# Patient Record
Sex: FEMALE | Race: WHITE | NOT HISPANIC OR LATINO | URBAN - METROPOLITAN AREA
[De-identification: names, ages, dates, MRNs, and addresses within clinical notes are randomized per-mention and may not be internally consistent; named-entity substitution may affect disease eponyms.]

---

## 2020-11-16 ENCOUNTER — TELEPHONE ENCOUNTER (OUTPATIENT)
Dept: URBAN - METROPOLITAN AREA CLINIC 113 | Facility: CLINIC | Age: 70
End: 2020-11-16

## 2020-12-09 ENCOUNTER — OFFICE VISIT (OUTPATIENT)
Dept: URBAN - METROPOLITAN AREA CLINIC 72 | Facility: CLINIC | Age: 70
End: 2020-12-09

## 2020-12-16 ENCOUNTER — WEB ENCOUNTER (OUTPATIENT)
Dept: URBAN - METROPOLITAN AREA CLINIC 72 | Facility: CLINIC | Age: 70
End: 2020-12-16

## 2020-12-16 ENCOUNTER — OFFICE VISIT (OUTPATIENT)
Dept: URBAN - METROPOLITAN AREA CLINIC 72 | Facility: CLINIC | Age: 70
End: 2020-12-16
Payer: MEDICARE

## 2020-12-16 ENCOUNTER — TELEPHONE ENCOUNTER (OUTPATIENT)
Dept: URBAN - METROPOLITAN AREA CLINIC 113 | Facility: CLINIC | Age: 70
End: 2020-12-16

## 2020-12-16 VITALS
TEMPERATURE: 98.6 F | HEART RATE: 90 BPM | WEIGHT: 185 LBS | RESPIRATION RATE: 20 BRPM | BODY MASS INDEX: 31.58 KG/M2 | HEIGHT: 64 IN | DIASTOLIC BLOOD PRESSURE: 79 MMHG | SYSTOLIC BLOOD PRESSURE: 151 MMHG

## 2020-12-16 DIAGNOSIS — Z12.11 COLON CANCER SCREENING: ICD-10-CM

## 2020-12-16 DIAGNOSIS — R10.84 GENERALIZED ABDOMINAL PAIN: ICD-10-CM

## 2020-12-16 PROCEDURE — G8483 FLU IMM NO ADMIN DOC REA: HCPCS | Performed by: INTERNAL MEDICINE

## 2020-12-16 PROCEDURE — 3017F COLORECTAL CA SCREEN DOC REV: CPT | Performed by: INTERNAL MEDICINE

## 2020-12-16 PROCEDURE — 1036F TOBACCO NON-USER: CPT | Performed by: INTERNAL MEDICINE

## 2020-12-16 PROCEDURE — 99203 OFFICE O/P NEW LOW 30 MIN: CPT | Performed by: INTERNAL MEDICINE

## 2020-12-16 PROCEDURE — G8427 DOCREV CUR MEDS BY ELIG CLIN: HCPCS | Performed by: INTERNAL MEDICINE

## 2020-12-16 PROCEDURE — G8419 CALC BMI OUT NRM PARAM NOF/U: HCPCS | Performed by: INTERNAL MEDICINE

## 2020-12-16 RX ORDER — ATORVASTATIN CALCIUM 20 MG/1
1 TABLET TABLET, FILM COATED ORAL ONCE A DAY
Status: ACTIVE | COMMUNITY

## 2020-12-16 RX ORDER — CYCLOBENZAPRINE HYDROCHLORIDE 5 MG/1
1 TABLET TABLET, FILM COATED ORAL ONCE A DAY
Status: ACTIVE | COMMUNITY

## 2020-12-16 RX ORDER — LISINOPRIL 10 MG/1
1 TABLET TABLET ORAL ONCE A DAY
Status: ACTIVE | COMMUNITY

## 2020-12-16 RX ORDER — ALENDRONATE SODIUM 70 MG/1
1 TABLET 30 MINUTES BEFORE THE FIRST FOOD, BEVERAGE OR MEDICINE OF THE DAY WITH PLAIN WATER TABLET ORAL
Status: ACTIVE | COMMUNITY

## 2020-12-16 RX ORDER — TOCILIZUMAB 20 MG/ML
AS DIRECTED INJECTION, SOLUTION, CONCENTRATE INTRAVENOUS
Status: ACTIVE | COMMUNITY

## 2020-12-16 RX ORDER — METHOTREXATE 2.5 MG/1
TAKE 8 TABLETS TABLET ORAL
Status: ACTIVE | COMMUNITY

## 2020-12-16 RX ORDER — METHYLCELLULOSE 2 G/19G
1 TABLESPOON POWDER, FOR SOLUTION ORAL EVERY OTHER DAY
Status: ACTIVE | COMMUNITY

## 2020-12-16 RX ORDER — MENTHOL AND CAMPHOR AND METHYL SALICYLATE .16; .031; .1 G/G; G/G; G/G
AS DIRECTED GEL TOPICAL
Status: ON HOLD | COMMUNITY

## 2020-12-16 RX ORDER — UBIDECARENONE 30 MG
1 TAKE TABLET CAPSULE ORAL ONCE DAILY
Status: ACTIVE | COMMUNITY

## 2020-12-16 RX ORDER — FOLIC ACID 1 MG/1
3 TABLETS TABLET ORAL ONCE A DAY
Status: ACTIVE | COMMUNITY

## 2020-12-16 RX ORDER — CHOLECALCIFEROL (VITAMIN D3) 50 MCG
2 TABLET TABLET ORAL ONCE A DAY
Status: ACTIVE | COMMUNITY

## 2020-12-16 NOTE — EXAM-PHYSICAL EXAM
Patient wearing mask due to COVID-19  General--no acute distress, normal appearance Eyes--anicteric, no pallor HENT--normocephalic, atraumatic head Neck--no lymphadenopathy, non tender Chest--normal breath sounds, equal chest rise Heart--regular rate and rhythm, ejection murmur Abdomen--soft, non tender, non distended, bowel sounds present, no organomegaly Musculoskeletal--normal gait and station Skin--no jaundice, brusing noted Neurologic--Alert and oriented x 3, answers questions appropriately Psychiatric--stable mood, appropriate affect

## 2020-12-16 NOTE — HPI-TODAY'S VISIT:
Ms. Santacruz  is a 70-year-old female who presents for consultation, referred by Dr. Edis Chatterjee.  For consultation of her abdominal pain, referred by Dr. Edis Chatterjee.  She has never undergone colonoscopy.  She has a family history of colorectal cancer in her mother.  Her past medical history notable for osteoporosis, hypertension, vitamin D deficiency, aortic stenosis, polymyalgia rheumatica, rheumatoid arthritis.   She reports that at the end of Novemeber she had cramping sever abdominal pain and urgency, but no BM produced. When she did produce stool there was some BRBPR. Cramping lasted a few days then subsided. She is on high dose predniscone for her PMR. She denies any upper abdominal pain, she has no heart burn, no history of PUD.   She feels well todaywith no issues, her pain and bleeding have subsided.

## 2021-01-11 ENCOUNTER — TELEPHONE ENCOUNTER (OUTPATIENT)
Dept: URBAN - METROPOLITAN AREA CLINIC 113 | Facility: CLINIC | Age: 71
End: 2021-01-11

## 2021-01-15 ENCOUNTER — OFFICE VISIT (OUTPATIENT)
Dept: URBAN - METROPOLITAN AREA SURGERY CENTER 25 | Facility: SURGERY CENTER | Age: 71
End: 2021-01-15

## 2021-04-19 ENCOUNTER — OFFICE VISIT (OUTPATIENT)
Dept: URBAN - METROPOLITAN AREA CLINIC 113 | Facility: CLINIC | Age: 71
End: 2021-04-19
Payer: MEDICARE

## 2021-04-19 VITALS
SYSTOLIC BLOOD PRESSURE: 133 MMHG | HEIGHT: 64 IN | BODY MASS INDEX: 30.9 KG/M2 | HEART RATE: 99 BPM | WEIGHT: 181 LBS | TEMPERATURE: 97.7 F | DIASTOLIC BLOOD PRESSURE: 68 MMHG

## 2021-04-19 DIAGNOSIS — K62.5 RECTAL BLEEDING: ICD-10-CM

## 2021-04-19 DIAGNOSIS — R10.84 GENERALIZED ABDOMINAL PAIN: ICD-10-CM

## 2021-04-19 DIAGNOSIS — Z12.11 COLON CANCER SCREENING: ICD-10-CM

## 2021-04-19 PROCEDURE — 99213 OFFICE O/P EST LOW 20 MIN: CPT | Performed by: INTERNAL MEDICINE

## 2021-04-19 RX ORDER — SODIUM, POTASSIUM,MAG SULFATES 17.5-3.13G
354ML SOLUTION, RECONSTITUTED, ORAL ORAL
Qty: 354 MILLILITER | Refills: 0 | OUTPATIENT
Start: 2021-04-19 | End: 2021-04-20

## 2021-04-19 RX ORDER — CHOLECALCIFEROL (VITAMIN D3) 50 MCG
2 TABLET TABLET ORAL ONCE A DAY
Status: ACTIVE | COMMUNITY

## 2021-04-19 RX ORDER — ATORVASTATIN CALCIUM 20 MG/1
1 TABLET TABLET, FILM COATED ORAL ONCE A DAY
Status: ACTIVE | COMMUNITY

## 2021-04-19 RX ORDER — METHYLCELLULOSE 2 G/19G
1 TABLESPOON POWDER, FOR SOLUTION ORAL EVERY OTHER DAY
Status: ACTIVE | COMMUNITY

## 2021-04-19 RX ORDER — FOLIC ACID 1 MG/1
3 TABLETS TABLET ORAL ONCE A DAY
Status: ACTIVE | COMMUNITY

## 2021-04-19 RX ORDER — METHOTREXATE 2.5 MG/1
TAKE 8 TABLETS TABLET ORAL
Status: ACTIVE | COMMUNITY

## 2021-04-19 RX ORDER — POLYETHYLENE GLYCOL 3350 17 G/17G
AS DIRECTED POWDER, FOR SOLUTION ORAL
Status: ACTIVE | COMMUNITY

## 2021-04-19 RX ORDER — TOCILIZUMAB 20 MG/ML
AS DIRECTED INJECTION, SOLUTION, CONCENTRATE INTRAVENOUS
Status: ACTIVE | COMMUNITY

## 2021-04-19 RX ORDER — ALENDRONATE SODIUM 70 MG/1
1 TABLET 30 MINUTES BEFORE THE FIRST FOOD, BEVERAGE OR MEDICINE OF THE DAY WITH PLAIN WATER TABLET ORAL
Status: ACTIVE | COMMUNITY

## 2021-04-19 RX ORDER — UBIDECARENONE 30 MG
1 TAKE TABLET CAPSULE ORAL ONCE DAILY
Status: ACTIVE | COMMUNITY

## 2021-04-19 RX ORDER — MENTHOL AND CAMPHOR AND METHYL SALICYLATE .16; .031; .1 G/G; G/G; G/G
AS DIRECTED GEL TOPICAL
Status: ON HOLD | COMMUNITY

## 2021-04-19 RX ORDER — CYCLOBENZAPRINE HYDROCHLORIDE 5 MG/1
1 TABLET TABLET, FILM COATED ORAL ONCE A DAY
Status: ACTIVE | COMMUNITY

## 2021-04-19 RX ORDER — LISINOPRIL 10 MG/1
1 TABLET TABLET ORAL ONCE A DAY
Status: ACTIVE | COMMUNITY

## 2021-04-19 NOTE — PHYSICAL EXAM CARDIOVASCULAR:
regular rate and rhythm , S1, S2 normal ,  , no murmurs, rubs, gallops , no edema , regular rate and rhythm , S1, S2 normal ,  , no murmurs, rubs, gallops , no edema

## 2021-04-19 NOTE — HPI-TODAY'S VISIT:
70-year-old female presenting for follow-up. She was referred back in December 2020.  A colonoscopy was scheduled however was not performed becausePoonam wanted to be vaccinated for Covid 19 prior to her colonoscopy.  She is here today for follow-up. She states that her abdominal pain from December is currently gone. She denies any nausea/emesis or diarrhea.  She did have blood back in December but it did not recur since then.   She does have some constipation and takes Miralax twice per week.   12/16/2020 Ms. Santacruz  is a 70-year-old female who presents for consultation, referred by Dr. Edis Chatterjee.  For consultation of her abdominal pain, referred by Dr. Edis Chatterjee.  She has never undergone colonoscopy.  She has a family history of colorectal cancer in her mother.  Her past medical history notable for osteoporosis, hypertension, vitamin D deficiency, aortic stenosis, polymyalgia rheumatica, rheumatoid arthritis.   She reports that at the end of Novemeber she had cramping sever abdominal pain and urgency, but no BM produced. When she did produce stool there was some BRBPR. Cramping lasted a few days then subsided. She is on high dose predniscone for her PMR. She denies any upper abdominal pain, she has no heart burn, no history of PUD.   She feels well todaywith no issues, her pain and bleeding have subsided.

## 2021-04-19 NOTE — PHYSICAL EXAM CONSTITUTIONAL:
alert ,  pleasant, well nourished, in no acute distress , alert ,  pleasant, well nourished, in no acute distress

## 2021-04-19 NOTE — PHYSICAL EXAM GASTROINTESTINAL
soft, nontender, nondistended , normal bowel sounds , soft, nontender, nondistended , normal bowel sounds

## 2021-04-19 NOTE — PHYSICAL EXAM LUNGS:
no increased work of breathing or signs of respiratory distress, clear to auscultation bilaterally  , no increased work of breathing or signs of respiratory distress, clear to auscultation bilaterally

## 2021-05-10 ENCOUNTER — CLAIMS CREATED FROM THE CLAIM WINDOW (OUTPATIENT)
Dept: URBAN - METROPOLITAN AREA CLINIC 4 | Facility: CLINIC | Age: 71
End: 2021-05-10
Payer: MEDICARE

## 2021-05-10 ENCOUNTER — OFFICE VISIT (OUTPATIENT)
Dept: URBAN - METROPOLITAN AREA SURGERY CENTER 25 | Facility: SURGERY CENTER | Age: 71
End: 2021-05-10
Payer: MEDICARE

## 2021-05-10 DIAGNOSIS — D12.5 BENIGN NEOPLASM OF SIGMOID COLON: ICD-10-CM

## 2021-05-10 DIAGNOSIS — K62.5 RECTAL BLEEDING: ICD-10-CM

## 2021-05-10 DIAGNOSIS — D12.5 ADENOMA OF SIGMOID COLON: ICD-10-CM

## 2021-05-10 DIAGNOSIS — Z80.0 FAMILY HISTORY OF COLON CANCER: ICD-10-CM

## 2021-05-10 DIAGNOSIS — K64.1 GRADE II INTERNAL HEMORRHOIDS: ICD-10-CM

## 2021-05-10 PROCEDURE — 88305 TISSUE EXAM BY PATHOLOGIST: CPT | Performed by: PATHOLOGY

## 2021-05-10 PROCEDURE — 45385 COLONOSCOPY W/LESION REMOVAL: CPT | Performed by: INTERNAL MEDICINE

## 2021-05-10 PROCEDURE — G8907 PT DOC NO EVENTS ON DISCHARG: HCPCS | Performed by: INTERNAL MEDICINE

## 2021-05-10 RX ORDER — CYCLOBENZAPRINE HYDROCHLORIDE 5 MG/1
1 TABLET TABLET, FILM COATED ORAL ONCE A DAY
Status: ACTIVE | COMMUNITY

## 2021-05-10 RX ORDER — LISINOPRIL 10 MG/1
1 TABLET TABLET ORAL ONCE A DAY
Status: ACTIVE | COMMUNITY

## 2021-05-10 RX ORDER — METHYLCELLULOSE 2 G/19G
1 TABLESPOON POWDER, FOR SOLUTION ORAL EVERY OTHER DAY
Status: ACTIVE | COMMUNITY

## 2021-05-10 RX ORDER — METHOTREXATE 2.5 MG/1
TAKE 8 TABLETS TABLET ORAL
Status: ACTIVE | COMMUNITY

## 2021-05-10 RX ORDER — FOLIC ACID 1 MG/1
3 TABLETS TABLET ORAL ONCE A DAY
Status: ACTIVE | COMMUNITY

## 2021-05-10 RX ORDER — CHOLECALCIFEROL (VITAMIN D3) 50 MCG
2 TABLET TABLET ORAL ONCE A DAY
Status: ACTIVE | COMMUNITY

## 2021-05-10 RX ORDER — MENTHOL AND CAMPHOR AND METHYL SALICYLATE .16; .031; .1 G/G; G/G; G/G
AS DIRECTED GEL TOPICAL
Status: ON HOLD | COMMUNITY

## 2021-05-10 RX ORDER — POLYETHYLENE GLYCOL 3350 17 G/17G
AS DIRECTED POWDER, FOR SOLUTION ORAL
Status: ACTIVE | COMMUNITY

## 2021-05-10 RX ORDER — UBIDECARENONE 30 MG
1 TAKE TABLET CAPSULE ORAL ONCE DAILY
Status: ACTIVE | COMMUNITY

## 2021-05-10 RX ORDER — ALENDRONATE SODIUM 70 MG/1
1 TABLET 30 MINUTES BEFORE THE FIRST FOOD, BEVERAGE OR MEDICINE OF THE DAY WITH PLAIN WATER TABLET ORAL
Status: ACTIVE | COMMUNITY

## 2021-05-10 RX ORDER — TOCILIZUMAB 20 MG/ML
AS DIRECTED INJECTION, SOLUTION, CONCENTRATE INTRAVENOUS
Status: ACTIVE | COMMUNITY

## 2021-05-10 RX ORDER — ATORVASTATIN CALCIUM 20 MG/1
1 TABLET TABLET, FILM COATED ORAL ONCE A DAY
Status: ACTIVE | COMMUNITY

## 2021-06-02 ENCOUNTER — OFFICE VISIT (OUTPATIENT)
Dept: URBAN - METROPOLITAN AREA CLINIC 113 | Facility: CLINIC | Age: 71
End: 2021-06-02

## 2021-06-03 ENCOUNTER — OFFICE VISIT (OUTPATIENT)
Dept: URBAN - METROPOLITAN AREA CLINIC 113 | Facility: CLINIC | Age: 71
End: 2021-06-03
Payer: MEDICARE

## 2021-06-03 VITALS
DIASTOLIC BLOOD PRESSURE: 60 MMHG | HEIGHT: 64 IN | HEART RATE: 88 BPM | RESPIRATION RATE: 18 BRPM | BODY MASS INDEX: 30.39 KG/M2 | TEMPERATURE: 97.1 F | WEIGHT: 178 LBS | SYSTOLIC BLOOD PRESSURE: 120 MMHG

## 2021-06-03 DIAGNOSIS — Z80.0 FAMILY HISTORY OF COLON CANCER IN MOTHER: ICD-10-CM

## 2021-06-03 DIAGNOSIS — K59.09 CHRONIC CONSTIPATION: ICD-10-CM

## 2021-06-03 DIAGNOSIS — Z86.010 HISTORY OF ADENOMATOUS POLYP OF COLON: ICD-10-CM

## 2021-06-03 DIAGNOSIS — K57.30 COLON, DIVERTICULOSIS: ICD-10-CM

## 2021-06-03 PROCEDURE — 99213 OFFICE O/P EST LOW 20 MIN: CPT | Performed by: INTERNAL MEDICINE

## 2021-06-03 RX ORDER — METHYLCELLULOSE 2 G/19G
1 TABLESPOON POWDER, FOR SOLUTION ORAL EVERY OTHER DAY
Status: ACTIVE | COMMUNITY

## 2021-06-03 RX ORDER — LISINOPRIL 10 MG/1
1 TABLET TABLET ORAL ONCE A DAY
Status: ACTIVE | COMMUNITY

## 2021-06-03 RX ORDER — MENTHOL AND CAMPHOR AND METHYL SALICYLATE .16; .031; .1 G/G; G/G; G/G
AS DIRECTED GEL TOPICAL
Status: DISCONTINUED | COMMUNITY

## 2021-06-03 RX ORDER — METHOTREXATE 2.5 MG/1
TAKE 8 TABLETS TABLET ORAL
Status: ACTIVE | COMMUNITY

## 2021-06-03 RX ORDER — ALENDRONATE SODIUM 70 MG/1
1 TABLET 30 MINUTES BEFORE THE FIRST FOOD, BEVERAGE OR MEDICINE OF THE DAY WITH PLAIN WATER TABLET ORAL
Status: ACTIVE | COMMUNITY

## 2021-06-03 RX ORDER — TOCILIZUMAB 20 MG/ML
AS DIRECTED INJECTION, SOLUTION, CONCENTRATE INTRAVENOUS
Status: ACTIVE | COMMUNITY

## 2021-06-03 RX ORDER — FOLIC ACID 1 MG/1
3 TABLETS TABLET ORAL ONCE A DAY
Status: ACTIVE | COMMUNITY

## 2021-06-03 RX ORDER — CHOLECALCIFEROL (VITAMIN D3) 50 MCG
2 TABLET TABLET ORAL ONCE A DAY
Status: ACTIVE | COMMUNITY

## 2021-06-03 RX ORDER — ATORVASTATIN CALCIUM 20 MG/1
1 TABLET TABLET, FILM COATED ORAL ONCE A DAY
Status: ACTIVE | COMMUNITY

## 2021-06-03 RX ORDER — UBIDECARENONE 30 MG
1 TAKE TABLET CAPSULE ORAL ONCE DAILY
Status: ACTIVE | COMMUNITY

## 2021-06-03 RX ORDER — CYCLOBENZAPRINE HYDROCHLORIDE 5 MG/1
1 TABLET TABLET, FILM COATED ORAL ONCE A DAY
Status: ACTIVE | COMMUNITY

## 2021-06-03 RX ORDER — POLYETHYLENE GLYCOL 3350 17 G/17G
AS DIRECTED POWDER, FOR SOLUTION ORAL
Status: ACTIVE | COMMUNITY

## 2021-06-03 NOTE — HPI-TODAY'S VISIT:
This is a 70-year-old female with a history of osteoporosis, hypertension, aortic stenosis, polymyalgia rheumatica, rheumatoid arthritis, vitamin D deficiency, and hyperlipidemia presenting for follow-up after a screening colonoscopy.   She was last seen 4/19/2021.  She had previously been referred for screening colonoscopy and abdominal pain.  She admitted rectal bleeding.  She preferred to wait for Covid vaccination prior to colonoscopy.  She had experienced abdominal pain in December that had resolved.  She was scheduled for a colonoscopy.   She reports a history of constipation for which she uses MiraLAX intermittently.  She has found that she is dependent upon it.  She denies red blood per rectum, abdominal pain, or any other abdominal symptoms.  Her mother had colon cancer at age 70.

## 2021-06-03 NOTE — HPI-OTHER HISTORIES
Colonoscopy 5/10/2021: BBPS 9, pancolonic diverticulosis, nonbleeding grade 2 internal hemorrhoids, removal of a 12 mm pedunculated sigmoid tubular adenoma.  Surveillance recommended in 2024.

## 2021-06-05 ENCOUNTER — DASHBOARD ENCOUNTERS (OUTPATIENT)
Age: 71
End: 2021-06-05

## 2021-06-05 PROBLEM — 429047008: Status: ACTIVE | Noted: 2021-06-03

## 2021-06-05 PROBLEM — 312824007: Status: ACTIVE | Noted: 2021-06-03

## 2021-06-05 PROBLEM — 733657002: Status: ACTIVE | Noted: 2021-06-03

## 2022-12-09 ENCOUNTER — WEB ENCOUNTER (OUTPATIENT)
Dept: URBAN - METROPOLITAN AREA CLINIC 113 | Facility: CLINIC | Age: 72
End: 2022-12-09
